# Patient Record
Sex: FEMALE | Race: WHITE | NOT HISPANIC OR LATINO | URBAN - METROPOLITAN AREA
[De-identification: names, ages, dates, MRNs, and addresses within clinical notes are randomized per-mention and may not be internally consistent; named-entity substitution may affect disease eponyms.]

---

## 2022-02-07 ENCOUNTER — OUT OF OFFICE VISIT (OUTPATIENT)
Dept: URBAN - METROPOLITAN AREA MEDICAL CENTER 12 | Facility: MEDICAL CENTER | Age: 57
End: 2022-02-07

## 2022-02-07 ENCOUNTER — CLAIMS CREATED FROM THE CLAIM WINDOW (OUTPATIENT)
Dept: URBAN - METROPOLITAN AREA MEDICAL CENTER 12 | Facility: MEDICAL CENTER | Age: 57
End: 2022-02-07
Payer: COMMERCIAL

## 2022-02-07 DIAGNOSIS — Z93.1 FEEDING BY G-TUBE: ICD-10-CM

## 2022-02-07 DIAGNOSIS — Z85.21 HISTORY OF LARYNGEAL CANCER: ICD-10-CM

## 2022-02-07 DIAGNOSIS — R13.19 CERVICAL DYSPHAGIA: ICD-10-CM

## 2022-02-07 DIAGNOSIS — K22.2 ACQUIRED ESOPHAGEAL RING: ICD-10-CM

## 2022-02-07 PROCEDURE — 43249 ESOPH EGD DILATION <30 MM: CPT | Performed by: INTERNAL MEDICINE

## 2022-02-07 PROCEDURE — 99204 OFFICE O/P NEW MOD 45 MIN: CPT | Performed by: INTERNAL MEDICINE

## 2023-01-26 ENCOUNTER — OFFICE VISIT (OUTPATIENT)
Dept: URBAN - METROPOLITAN AREA CLINIC 118 | Facility: CLINIC | Age: 58
End: 2023-01-26
Payer: COMMERCIAL

## 2023-01-26 ENCOUNTER — DASHBOARD ENCOUNTERS (OUTPATIENT)
Age: 58
End: 2023-01-26

## 2023-01-26 ENCOUNTER — LAB OUTSIDE AN ENCOUNTER (OUTPATIENT)
Dept: URBAN - METROPOLITAN AREA CLINIC 118 | Facility: CLINIC | Age: 58
End: 2023-01-26

## 2023-01-26 DIAGNOSIS — Z12.11 SCREENING FOR COLON CANCER: ICD-10-CM

## 2023-01-26 DIAGNOSIS — K22.2 ESOPHAGEAL STENOSIS: ICD-10-CM

## 2023-01-26 PROBLEM — 931000119107: Status: ACTIVE | Noted: 2023-01-26

## 2023-01-26 PROBLEM — 429277008: Status: ACTIVE | Noted: 2023-01-26

## 2023-01-26 PROBLEM — 13645005: Status: ACTIVE | Noted: 2023-01-26

## 2023-01-26 PROBLEM — 40739000: Status: ACTIVE | Noted: 2023-01-26

## 2023-01-26 PROBLEM — 428653001: Status: ACTIVE | Noted: 2023-01-26

## 2023-01-26 PROBLEM — 300286002: Status: ACTIVE | Noted: 2023-01-26

## 2023-01-26 PROBLEM — 363486007: Status: ACTIVE | Noted: 2023-01-26

## 2023-01-26 PROBLEM — 429479009: Status: ACTIVE | Noted: 2023-01-26

## 2023-01-26 PROCEDURE — 99204 OFFICE O/P NEW MOD 45 MIN: CPT | Performed by: INTERNAL MEDICINE

## 2023-01-26 RX ORDER — POLYETHYLENE GLYCOL 3350, SODIUM SULFATE ANHYDROUS, SODIUM BICARBONATE, SODIUM CHLORIDE, POTASSIUM CHLORIDE 236; 22.74; 6.74; 5.86; 2.97 G/4L; G/4L; G/4L; G/4L; G/4L
AS DIRECTED POWDER, FOR SOLUTION ORAL 1
Qty: 1 | Refills: 0 | OUTPATIENT
Start: 2023-01-26 | End: 2023-01-27

## 2023-01-26 NOTE — PHYSICAL EXAM GASTROINTESTINAL
Abdomen , soft, nontender, nondistended , no guarding or rigidity , no masses palpable, PEG in place, granulation tissue and mild fungal infection noted  PEG tube deflated, removed, new PEG inserted, reinflated, patency checked, treated granulation tissue

## 2023-01-26 NOTE — PHYSICAL EXAM CONSTITUTIONAL:
well developed, well nourished , in no acute distress , ambulating without difficulty , abnormal communication ability

## 2023-01-26 NOTE — HPI-TODAY'S VISIT:
Ms. Pimentel is a 56 y/o F who is here for a screening colonoscopy. 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 Last EGD report we have on file was in early 2021 at [facility]. 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 The stenosis was traversed after downsizing scope and dilating. A TTS dilator was passed through the scope. Dilation with an 8-9-10 mm balloon dilator was performed to 10mm, the entire examined stomach was normal, there was a G-tube bumper visulazed in the body of the stomach, the exam was otherwise normal.  Today pt present to clinic w/ her  for a screening colonoscopy. Denies previous colonoscopy or FMHx of CRC. Denies changes in bowel habits, overt GI bleeding, abd pain or wt loss. Denies heart or lung issues, use of blood thinners or wt loss medications. Pt has a trach and is on continuous O2.    reports that pt has been requiring EGD with dilation every x3 wks. ENT at [facility] performes these. Pt's current PEG is from Nov and they are inquiring about a replacement as well as treating of granulation tissue.

## 2023-02-24 PROBLEM — 275978004 SCREENING FOR COLON CANCER: Status: ACTIVE | Noted: 2023-02-24

## 2023-05-02 ENCOUNTER — OFFICE VISIT (OUTPATIENT)
Dept: URBAN - METROPOLITAN AREA MEDICAL CENTER 16 | Facility: MEDICAL CENTER | Age: 58
End: 2023-05-02
Payer: COMMERCIAL

## 2023-05-02 DIAGNOSIS — Z12.11 AVERAGE RISK FOR CRC. DUE TO PT'S CO-MORBID STATE WITH END STAGE DEMENTIA, HIGH RISK FOR ANESTHESIA (PER NEUROLOGY); INABILITY TO TAKE A BOWEL PREP....WOULD NOT ADVISE ANY COLORECTAL CANCER SCREENING INCLUDING STOOL TEST FOR FECAL BLOOD.: ICD-10-CM

## 2023-05-02 PROCEDURE — 45378 DIAGNOSTIC COLONOSCOPY: CPT | Performed by: INTERNAL MEDICINE

## 2025-01-24 ENCOUNTER — CLAIMS CREATED FROM THE CLAIM WINDOW (OUTPATIENT)
Dept: URBAN - METROPOLITAN AREA MEDICAL CENTER 16 | Facility: MEDICAL CENTER | Age: 60
End: 2025-01-24

## 2025-01-24 PROCEDURE — 99254 IP/OBS CNSLTJ NEW/EST MOD 60: CPT | Performed by: INTERNAL MEDICINE

## 2025-03-04 ENCOUNTER — TELEPHONE ENCOUNTER (OUTPATIENT)
Dept: URBAN - METROPOLITAN AREA CLINIC 118 | Facility: CLINIC | Age: 60
End: 2025-03-04

## 2025-03-05 ENCOUNTER — OFFICE VISIT (OUTPATIENT)
Dept: URBAN - METROPOLITAN AREA CLINIC 118 | Facility: CLINIC | Age: 60
End: 2025-03-05
Payer: COMMERCIAL

## 2025-03-05 VITALS
TEMPERATURE: 96.8 F | BODY MASS INDEX: 16.51 KG/M2 | HEIGHT: 67 IN | DIASTOLIC BLOOD PRESSURE: 64 MMHG | HEART RATE: 118 BPM | WEIGHT: 105.2 LBS | SYSTOLIC BLOOD PRESSURE: 118 MMHG

## 2025-03-05 DIAGNOSIS — Z12.11 SCREENING FOR COLON CANCER: ICD-10-CM

## 2025-03-05 DIAGNOSIS — Z90.02 H/O LARYNGECTOMY: ICD-10-CM

## 2025-03-05 DIAGNOSIS — Z99.81 ON HOME O2: ICD-10-CM

## 2025-03-05 DIAGNOSIS — C32.0 VOCAL CORD CANCER: ICD-10-CM

## 2025-03-05 DIAGNOSIS — Z93.1 S/P PERCUTANEOUS ENDOSCOPIC GASTROSTOMY (PEG) TUBE PLACEMENT: ICD-10-CM

## 2025-03-05 DIAGNOSIS — R13.10 DYSPHAGIA, UNSPECIFIED TYPE: ICD-10-CM

## 2025-03-05 DIAGNOSIS — K22.2 ESOPHAGEAL STENOSIS: ICD-10-CM

## 2025-03-05 DIAGNOSIS — Z92.3 S/P RADIATION THERAPY: ICD-10-CM

## 2025-03-05 DIAGNOSIS — R11.2 NAUSEA AND VOMITING IN ADULT: ICD-10-CM

## 2025-03-05 DIAGNOSIS — K22.89 ESOPHAGEAL DILATATION: ICD-10-CM

## 2025-03-05 DIAGNOSIS — J44.9 CHRONIC OBSTRUCTIVE PULMONARY DISEASE, UNSPECIFIED COPD TYPE: ICD-10-CM

## 2025-03-05 PROCEDURE — 43762 RPLC GTUBE NO REVJ TRC: CPT | Performed by: INTERNAL MEDICINE

## 2025-03-05 PROCEDURE — 99214 OFFICE O/P EST MOD 30 MIN: CPT | Performed by: INTERNAL MEDICINE

## 2025-03-05 RX ORDER — PREDNISONE 10 MG/1
TAKE 4 TABS PO X 1 WEEK, TAKE 3 TABS PO X 1 WEEK, TAKE 2 TABS PO X 1 WEEK, TAKE 1 TAB PO X 1 WEEK TABLET ORAL ONCE A DAY
Status: ACTIVE | COMMUNITY

## 2025-03-05 RX ORDER — ESOMEPRAZOLE MAGNESIUM 40 MG/1
1 CAPSULE CAPSULE, DELAYED RELEASE PELLETS ORAL ONCE A DAY
Status: DISCONTINUED | COMMUNITY

## 2025-03-05 RX ORDER — ONDANSETRON 4 MG/1
1 TABLET ON THE TONGUE AND ALLOW TO DISSOLVE TABLET, ORALLY DISINTEGRATING ORAL
Status: ACTIVE | COMMUNITY

## 2025-03-05 RX ORDER — PROMETHAZINE HYDROCHLORIDE 6.25 MG/5ML
10 ML AS NEEDED FOR NAUSEA SYRUP ORAL
Qty: 600 MILLILITER | Refills: 1 | OUTPATIENT
Start: 2025-03-05

## 2025-03-05 RX ORDER — TIOTROPIUM BROMIDE INHALATION SPRAY 3.12 UG/1
2 PUFFS SPRAY, METERED RESPIRATORY (INHALATION) ONCE A DAY
Status: ACTIVE | COMMUNITY

## 2025-03-05 RX ORDER — ROFLUMILAST 500 UG/1
1 TABLET TABLET ORAL ONCE A DAY
Status: ACTIVE | COMMUNITY

## 2025-03-05 RX ORDER — ALPRAZOLAM 0.5 MG/1
1 TABLET TABLET ORAL TWICE A DAY
Status: ACTIVE | COMMUNITY

## 2025-03-05 RX ORDER — LEVOTHYROXINE SODIUM 88 UG/1
1 CAPSULE IN THE MORNING ON AN EMPTY STOMACH CAPSULE ORAL ONCE A DAY
Status: ACTIVE | COMMUNITY

## 2025-03-05 NOTE — HPI-TODAY'S VISIT:
3/5/25 accompanied by   reviewed her hospitalization chart, was in hospital for over a month where had PEG exchange , coded and had chest compressions IR placed {G tube 1/20/25 had prior esophageal dilation 2022,  reports on schedule with [facility] for dilation in 5 days  here due to worsening nausea, can't tolerate the tube feeds well, and reports the G tube is dark colored   1/2023 Ms. Pimentel is a 56 y/o F who is here for a screening colonoscopy. 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 Last EGD report we have on file was in early 2021 at [place]. 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 The stenosis was traversed after downsizing scope and dilating. A TTS dilator was passed through the scope. Dilation with an 8-9-10 mm balloon dilator was performed to 10mm, the entire examined stomach was normal, there was a G-tube bumper visulazed in the body of the stomach, the exam was otherwise normal.  Today pt present to clinic w/ her  for a screening colonoscopy. Denies previous colonoscopy or FMHx of CRC. Denies changes in bowel habits, overt GI bleeding, abd pain or wt loss. Denies heart or lung issues, use of blood thinners or wt loss medications. Pt has a trach and is on continuous O2.    reports that pt has been requiring EGD with dilation every x3 wks. ENT at [place] performes these. Pt's current PEG is from Nov and they are inquiring about a replacement as well as treating of granulation tissue.

## 2025-03-05 NOTE — PHYSICAL EXAM GASTROINTESTINAL
18FR PEG TUBE WITH BLACK DISCOLORATION OF THE TUBING, THE OLD BALLOON WAS DEFLATED, THE OLD TUBE WAS REMOVED, AND NEW 18FR G TUBE WAS PLACED AND INFLATED TO 20CC WATER

## 2025-03-13 ENCOUNTER — TELEPHONE ENCOUNTER (OUTPATIENT)
Dept: URBAN - METROPOLITAN AREA CLINIC 118 | Facility: CLINIC | Age: 60
End: 2025-03-13

## 2025-03-18 ENCOUNTER — OFFICE VISIT (OUTPATIENT)
Dept: URBAN - METROPOLITAN AREA CLINIC 118 | Facility: CLINIC | Age: 60
End: 2025-03-18
Payer: COMMERCIAL

## 2025-03-18 VITALS
WEIGHT: 98.4 LBS | SYSTOLIC BLOOD PRESSURE: 96 MMHG | DIASTOLIC BLOOD PRESSURE: 58 MMHG | HEART RATE: 105 BPM | HEIGHT: 67 IN | BODY MASS INDEX: 15.44 KG/M2 | TEMPERATURE: 98.4 F

## 2025-03-18 DIAGNOSIS — K22.2 ESOPHAGEAL STENOSIS: ICD-10-CM

## 2025-03-18 DIAGNOSIS — Z92.3 S/P RADIATION THERAPY: ICD-10-CM

## 2025-03-18 DIAGNOSIS — R11.2 NAUSEA AND VOMITING IN ADULT: ICD-10-CM

## 2025-03-18 DIAGNOSIS — Z99.81 ON HOME O2: ICD-10-CM

## 2025-03-18 DIAGNOSIS — R43.2 DYSGEUSIA: ICD-10-CM

## 2025-03-18 DIAGNOSIS — J44.9 CHRONIC OBSTRUCTIVE PULMONARY DISEASE, UNSPECIFIED COPD TYPE: ICD-10-CM

## 2025-03-18 DIAGNOSIS — C32.0 VOCAL CORD CANCER: ICD-10-CM

## 2025-03-18 DIAGNOSIS — Z93.1 S/P PERCUTANEOUS ENDOSCOPIC GASTROSTOMY (PEG) TUBE PLACEMENT: ICD-10-CM

## 2025-03-18 DIAGNOSIS — R13.10 DYSPHAGIA, UNSPECIFIED TYPE: ICD-10-CM

## 2025-03-18 DIAGNOSIS — Z12.11 SCREENING FOR COLON CANCER: ICD-10-CM

## 2025-03-18 DIAGNOSIS — R63.4 WEIGHT LOSS, UNINTENTIONAL: ICD-10-CM

## 2025-03-18 DIAGNOSIS — K22.89 ESOPHAGEAL DILATATION: ICD-10-CM

## 2025-03-18 PROBLEM — 271801002: Status: ACTIVE | Noted: 2025-03-18

## 2025-03-18 PROCEDURE — 99215 OFFICE O/P EST HI 40 MIN: CPT | Performed by: INTERNAL MEDICINE

## 2025-03-18 RX ORDER — ONDANSETRON 4 MG/1
1 TABLET ON THE TONGUE AND ALLOW TO DISSOLVE TABLET, ORALLY DISINTEGRATING ORAL
Status: ACTIVE | COMMUNITY

## 2025-03-18 RX ORDER — ALPRAZOLAM 0.5 MG/1
1 TABLET TABLET ORAL TWICE A DAY
Status: ACTIVE | COMMUNITY

## 2025-03-18 RX ORDER — ROFLUMILAST 500 UG/1
1 TABLET TABLET ORAL ONCE A DAY
Status: ACTIVE | COMMUNITY

## 2025-03-18 RX ORDER — PREDNISONE 10 MG/1
TAKE 4 TABS PO X 1 WEEK, TAKE 3 TABS PO X 1 WEEK, TAKE 2 TABS PO X 1 WEEK, TAKE 1 TAB PO X 1 WEEK TABLET ORAL ONCE A DAY
Status: ACTIVE | COMMUNITY

## 2025-03-18 RX ORDER — PROMETHAZINE HYDROCHLORIDE 6.25 MG/5ML
10 ML AS NEEDED FOR NAUSEA SYRUP ORAL
Qty: 600 MILLILITER | Refills: 1 | Status: ACTIVE | COMMUNITY
Start: 2025-03-05

## 2025-03-18 RX ORDER — TIOTROPIUM BROMIDE INHALATION SPRAY 3.12 UG/1
2 PUFFS SPRAY, METERED RESPIRATORY (INHALATION) ONCE A DAY
Status: ACTIVE | COMMUNITY

## 2025-03-18 RX ORDER — LEVOTHYROXINE SODIUM 88 UG/1
1 CAPSULE IN THE MORNING ON AN EMPTY STOMACH CAPSULE ORAL ONCE A DAY
Status: ACTIVE | COMMUNITY

## 2025-03-18 NOTE — PHYSICAL EXAM GASTROINTESTINAL
18FR PEG TUBE in place, mild erythema of the skin but no induration or tenderness around the tube  they report some leakage around the tube

## 2025-03-18 NOTE — HPI-TODAY'S VISIT:
3/18/25 accompanied by  who helps with history reports taste buds completely off, even water tastes bad still losing weight able to tolerate some oral food after the dilation  following with pulm for the lung lesion (bx was neg)   3/10/25 ENT esoph dilation report:   Findings: Her re-stenosis is again to a caliber of approximately 4 mm with the scope not being able to pass prior to dilation. She was dilated to 15 mm. Stable remainder of her examination. Procedure Details:After informed consent was obtained the patient was brought to the endoscopy suite. A time out was performed for patient safety. Once an adequate depth of sedation was achieved the patient was laid supine. The transnasal flexible esophagoscope was passed transnasally via the right nare. It was advanced to image the neopharynx, which was normal. The scope was advanced to exam the cervical esophagus and advanced where a tight (4 mm) stricture was evident at approximately 18-20 cm from the nose. No inflammation or granulation tissue. A flexible tip guide wire was inserted via the gastroscope channel and advanced under visualization passed the stricture as the scope would not pass the stricture itself.The scope was then removed while advancing the wire. Using a Seldinger technique a 10-12 mm radial expansion balloon was placed over the wire and advanced through the pharynx via the right nostril. The esophagoscope was then replaced in the right nare and advanced to where the stricture was visualized. The balloon was then dilated to 10 and subsequently to 12 mm and left in place for one minute. The balloon did require re-positioning, but was able to be well seated. It was then deflated and there was evidence of mild tearing. Next, a 12-15 mm balloon was placed again via Seldinger technique after removing the smaller balloon. This balloon was then advanced and under visualization of the esophagoscope was spanning the narrowed segment. The balloon was inflated and left in place. Again, I was able to reach 15 mm, where the balloon was well seated, but significant resistance to dilation at 15 mm.The esophagsocope was then advanced passed the stricture and into the stomach. The stomach was inspected and found to be normal. The stomach was then deflated with suction. A diagnostic esophagoscopy was performed on retraction of the scope. All of the esophagus appeared normal with no apparent lesions. There was good luminal integrity at the level of the stricture. The esophagoscope was then removed and the patient was turned over to anesthesia for emergence.   3/5/25 accompanied by   reviewed her hospitalization chart, was in hospital for over a month where had PEG exchange , coded and had chest compressions IR placed {G tube 1/20/25 had prior esophageal dilation 2022,  reports on schedule with [facility] for dilation in 5 days  here due to worsening nausea, can't tolerate the tube feeds well, and reports the G tube is dark colored   1/2023 Ms. Pimentel is a 56 y/o F who is here for a screening colonoscopy. 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 Last EGD report we have on file was in early 2021 at [place]. 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 The stenosis was traversed after downsizing scope and dilating. A TTS dilator was passed through the scope. Dilation with an 8-9-10 mm balloon dilator was performed to 10mm, the entire examined stomach was normal, there was a G-tube bumper visulazed in the body of the stomach, the exam was otherwise normal.  Today pt present to clinic w/ her  for a screening colonoscopy. Denies previous colonoscopy or FMHx of CRC. Denies changes in bowel habits, overt GI bleeding, abd pain or wt loss. Denies heart or lung issues, use of blood thinners or wt loss medications. Pt has a trach and is on continuous O2.    reports that pt has been requiring EGD with dilation every x3 wks. ENT at [place] performes these. Pt's current PEG is from Nov and they are inquiring about a replacement as well as treating of granulation tissue.

## 2025-04-03 ENCOUNTER — OUT OF OFFICE VISIT (OUTPATIENT)
Dept: URBAN - METROPOLITAN AREA MEDICAL CENTER 16 | Facility: MEDICAL CENTER | Age: 60
End: 2025-04-03
Payer: COMMERCIAL

## 2025-04-03 DIAGNOSIS — K29.60 ADENOPAPILLOMATOSIS GASTRICA: ICD-10-CM

## 2025-04-03 DIAGNOSIS — K22.2 ACQUIRED ESOPHAGEAL RING: ICD-10-CM

## 2025-04-03 PROCEDURE — REGDS: Performed by: INTERNAL MEDICINE

## 2025-04-03 PROCEDURE — 44799 UNLISTED PX SMALL INTESTINE: CPT | Performed by: INTERNAL MEDICINE

## 2025-04-03 PROCEDURE — 43220 ESOPHAGOSCOPY BALLOON <30MM: CPT | Performed by: INTERNAL MEDICINE

## 2025-04-03 RX ORDER — ROFLUMILAST 500 UG/1
1 TABLET TABLET ORAL ONCE A DAY
Status: ACTIVE | COMMUNITY

## 2025-04-03 RX ORDER — LEVOTHYROXINE SODIUM 88 UG/1
1 CAPSULE IN THE MORNING ON AN EMPTY STOMACH CAPSULE ORAL ONCE A DAY
Status: ACTIVE | COMMUNITY

## 2025-04-03 RX ORDER — PROMETHAZINE HYDROCHLORIDE 6.25 MG/5ML
10 ML AS NEEDED FOR NAUSEA SYRUP ORAL
Qty: 600 MILLILITER | Refills: 1 | Status: ACTIVE | COMMUNITY
Start: 2025-03-05

## 2025-04-03 RX ORDER — ONDANSETRON 4 MG/1
1 TABLET ON THE TONGUE AND ALLOW TO DISSOLVE TABLET, ORALLY DISINTEGRATING ORAL
Status: ACTIVE | COMMUNITY

## 2025-04-03 RX ORDER — TIOTROPIUM BROMIDE INHALATION SPRAY 3.12 UG/1
2 PUFFS SPRAY, METERED RESPIRATORY (INHALATION) ONCE A DAY
Status: ACTIVE | COMMUNITY

## 2025-04-03 RX ORDER — ALPRAZOLAM 0.5 MG/1
1 TABLET TABLET ORAL TWICE A DAY
Status: ACTIVE | COMMUNITY

## 2025-04-03 RX ORDER — PREDNISONE 10 MG/1
TAKE 4 TABS PO X 1 WEEK, TAKE 3 TABS PO X 1 WEEK, TAKE 2 TABS PO X 1 WEEK, TAKE 1 TAB PO X 1 WEEK TABLET ORAL ONCE A DAY
Status: ACTIVE | COMMUNITY

## 2025-04-29 ENCOUNTER — TELEPHONE ENCOUNTER (OUTPATIENT)
Dept: URBAN - METROPOLITAN AREA CLINIC 109 | Facility: CLINIC | Age: 60
End: 2025-04-29